# Patient Record
Sex: FEMALE | Race: WHITE | Employment: OTHER | URBAN - METROPOLITAN AREA
[De-identification: names, ages, dates, MRNs, and addresses within clinical notes are randomized per-mention and may not be internally consistent; named-entity substitution may affect disease eponyms.]

---

## 2017-03-15 PROCEDURE — 86038 ANTINUCLEAR ANTIBODIES: CPT | Performed by: PHYSICIAN ASSISTANT

## 2017-03-15 PROCEDURE — 86200 CCP ANTIBODY: CPT | Performed by: PHYSICIAN ASSISTANT

## 2017-03-15 PROCEDURE — 86431 RHEUMATOID FACTOR QUANT: CPT | Performed by: PHYSICIAN ASSISTANT

## 2017-03-15 PROCEDURE — 86812 HLA TYPING A B OR C: CPT | Performed by: PHYSICIAN ASSISTANT

## 2017-03-30 PROBLEM — F33.41 RECURRENT MAJOR DEPRESSIVE DISORDER, IN PARTIAL REMISSION (HCC): Status: ACTIVE | Noted: 2017-03-30

## 2017-03-30 PROBLEM — E66.01 SEVERE OBESITY (BMI 35.0-39.9): Status: ACTIVE | Noted: 2017-03-30

## 2017-04-13 RX ORDER — THIAMINE HCL 100 MG
500 TABLET ORAL DAILY
COMMUNITY
End: 2017-06-07

## 2017-04-19 ENCOUNTER — APPOINTMENT (OUTPATIENT)
Dept: LAB | Facility: HOSPITAL | Age: 67
End: 2017-04-19
Payer: MEDICARE

## 2017-04-19 DIAGNOSIS — K21.00 GASTROESOPHAGEAL REFLUX DISEASE WITH ESOPHAGITIS: ICD-10-CM

## 2017-04-19 PROCEDURE — 93010 ELECTROCARDIOGRAM REPORT: CPT | Performed by: INTERNAL MEDICINE

## 2017-04-19 PROCEDURE — 93005 ELECTROCARDIOGRAM TRACING: CPT

## 2017-05-11 ENCOUNTER — ANESTHESIA EVENT (OUTPATIENT)
Dept: ENDOSCOPY | Facility: HOSPITAL | Age: 67
End: 2017-05-11

## 2017-05-11 ENCOUNTER — HOSPITAL ENCOUNTER (OUTPATIENT)
Facility: HOSPITAL | Age: 67
Setting detail: HOSPITAL OUTPATIENT SURGERY
Discharge: HOME | End: 2017-05-11
Attending: INTERNAL MEDICINE | Admitting: INTERNAL MEDICINE
Payer: MEDICARE

## 2017-05-11 ENCOUNTER — ANESTHESIA (OUTPATIENT)
Dept: ENDOSCOPY | Facility: HOSPITAL | Age: 67
End: 2017-05-11

## 2017-05-11 ENCOUNTER — SURGERY (OUTPATIENT)
Age: 67
End: 2017-05-11

## 2017-05-11 VITALS
HEIGHT: 62 IN | RESPIRATION RATE: 16 BRPM | HEART RATE: 66 BPM | WEIGHT: 200 LBS | DIASTOLIC BLOOD PRESSURE: 87 MMHG | TEMPERATURE: 98 F | BODY MASS INDEX: 36.8 KG/M2 | OXYGEN SATURATION: 98 % | SYSTOLIC BLOOD PRESSURE: 119 MMHG

## 2017-05-11 DIAGNOSIS — R13.10 PROBLEMS WITH SWALLOWING AND MASTICATION: ICD-10-CM

## 2017-05-11 DIAGNOSIS — K21.00 GASTROESOPHAGEAL REFLUX DISEASE WITH ESOPHAGITIS: Primary | ICD-10-CM

## 2017-05-11 DIAGNOSIS — K21.00 REFLUX ESOPHAGITIS: ICD-10-CM

## 2017-05-11 DIAGNOSIS — K22.70 BARRETT'S ESOPHAGUS WITHOUT DYSPLASIA: ICD-10-CM

## 2017-05-11 DIAGNOSIS — K22.89 ESOPHAGEAL HEMORRHAGE: ICD-10-CM

## 2017-05-11 PROCEDURE — 88305 TISSUE EXAM BY PATHOLOGIST: CPT | Performed by: INTERNAL MEDICINE

## 2017-05-11 PROCEDURE — 0DB68ZX EXCISION OF STOMACH, VIA NATURAL OR ARTIFICIAL OPENING ENDOSCOPIC, DIAGNOSTIC: ICD-10-PCS | Performed by: INTERNAL MEDICINE

## 2017-05-11 PROCEDURE — 0DB78ZX EXCISION OF STOMACH, PYLORUS, VIA NATURAL OR ARTIFICIAL OPENING ENDOSCOPIC, DIAGNOSTIC: ICD-10-PCS | Performed by: INTERNAL MEDICINE

## 2017-05-11 PROCEDURE — 0DB98ZX EXCISION OF DUODENUM, VIA NATURAL OR ARTIFICIAL OPENING ENDOSCOPIC, DIAGNOSTIC: ICD-10-PCS | Performed by: INTERNAL MEDICINE

## 2017-05-11 RX ORDER — NALOXONE HYDROCHLORIDE 0.4 MG/ML
80 INJECTION, SOLUTION INTRAMUSCULAR; INTRAVENOUS; SUBCUTANEOUS AS NEEDED
Status: DISCONTINUED | OUTPATIENT
Start: 2017-05-11 | End: 2017-05-11

## 2017-05-11 RX ORDER — SODIUM CHLORIDE, SODIUM LACTATE, POTASSIUM CHLORIDE, CALCIUM CHLORIDE 600; 310; 30; 20 MG/100ML; MG/100ML; MG/100ML; MG/100ML
INJECTION, SOLUTION INTRAVENOUS CONTINUOUS
Status: DISCONTINUED | OUTPATIENT
Start: 2017-05-11 | End: 2017-05-11

## 2017-05-11 RX ORDER — ONDANSETRON 2 MG/ML
4 INJECTION INTRAMUSCULAR; INTRAVENOUS AS NEEDED
Status: DISCONTINUED | OUTPATIENT
Start: 2017-05-11 | End: 2017-05-11

## 2017-05-11 RX ORDER — DULOXETIN HYDROCHLORIDE 60 MG/1
60 CAPSULE, DELAYED RELEASE ORAL DAILY
COMMUNITY
End: 2017-06-07

## 2017-05-11 NOTE — OPERATIVE REPORT
[facility] OPERATIVE REPORT   PATIENT NAME: Sandeep Harmon  MRN: [id number]  DATE OF OPERATION: 5/11/2017  PREOPERATIVE DIAGNOSIS:   1. GERD  2. Chest pain  3. History of Genao's  4. Dysphagia   POSTOPERATIVE DIAGNOSES:  1. Gastritis  2.  Large hi the antrum and body were obtained with cold forceps in order to assess for H pylori. Several small gastric polyps were found in the body. A representative polyp was sampled with cold forceps.   A large hiatal hernia was found on retroflexed and forward vi

## 2017-05-11 NOTE — ANESTHESIA PREPROCEDURE EVALUATION
PRE-OP EVALUATION    Patient Name: Melba Bhatti    Pre-op Diagnosis: Reflux esophagitis [K21.0]  Esophageal hemorrhage [K22.8]  Problems with swallowing and mastication [R13.10]  Genao's esophagus without dysplasia [K22.70]    Procedure(s):  ESOPH Oxybutynin Chloride ER 5 MG Oral Tablet 24 Hr Take 1 tablet (5 mg total) by mouth daily. Disp: 90 tablet Rfl: 3   estradiol (CLIMARA) 0.05 MG/24HR Transdermal Patch Weekly Place 1 patch onto the skin every 7 days.  Disp: 12 patch Rfl: 4   Sulfacetamide So Available pre-op labs reviewed.     Lab Results  Component Value Date   WBC 8.09 03/15/2017   RBC 4.66 03/15/2017   HGB 14.7 03/15/2017   HCT 43.2 03/15/2017   MCV 92.7 03/15/2017   MCH 31.5 03/15/2017   MCHC 34.0 03/15/2017   RDW 12.7 03/15/2017   PLT

## 2017-05-11 NOTE — H&P
GI PRE-OP H&P    CC: GERD, non cardiac chest pain, Dysphagia, Genao's esophagus without dysplasia,       HPI:  Juan Rubin is a 74year old female who is here for EGD for above indications.     Past Medical History   Diagnosis Date   • GERD (gastr Mother      osteoarthritis   • Breast Cancer Sister 58   • Thyroid Disorder Sister        Smoking Status: Former Smoker                   Packs/Day: 0.25  Years: 7         Types: Cigarettes      Quit date: 08/27/1972    Smokeless Status: Never Used

## 2017-05-11 NOTE — ANESTHESIA POSTPROCEDURE EVALUATION
[address] [place] [address] Patient Status:  Hospital Outpatient Surgery   Age/Gender 74year old female MRN [id number]   Location [facility]. Attending Ike Adams MD   Hosp Day # 0 PCP Carson Mendoza MD       Anesthesia Po

## 2017-05-18 NOTE — PROGRESS NOTES
Quick Note:    [place] [address]    Dear Ms Nubia Booth    Your biopsies of the small bowel are normal. Your stomach polyps are fundic gland polyps.  This is a benign type of polyp usually caused by antacid medications

## 2017-06-19 PROBLEM — G89.29 CHRONIC LEFT SHOULDER PAIN: Status: ACTIVE | Noted: 2017-06-19

## 2017-06-19 PROBLEM — M75.02 ADHESIVE CAPSULITIS OF LEFT SHOULDER: Status: ACTIVE | Noted: 2017-06-19

## 2017-06-19 PROBLEM — G56.03 BILATERAL CARPAL TUNNEL SYNDROME: Status: ACTIVE | Noted: 2017-06-19

## 2017-06-19 PROBLEM — M65.312 TRIGGER FINGER OF LEFT THUMB: Status: ACTIVE | Noted: 2017-06-19

## 2017-06-19 PROBLEM — M25.512 CHRONIC LEFT SHOULDER PAIN: Status: ACTIVE | Noted: 2017-06-19

## 2017-06-19 PROBLEM — G56.23 CUBITAL TUNNEL SYNDROME, BILATERAL: Status: ACTIVE | Noted: 2017-06-19

## 2017-07-03 PROBLEM — M67.432 GANGLION, LEFT WRIST: Status: ACTIVE | Noted: 2017-07-03

## 2017-07-03 PROBLEM — M65.4 DE QUERVAIN'S TENOSYNOVITIS, LEFT: Status: ACTIVE | Noted: 2017-07-03

## 2017-08-04 PROBLEM — M54.12 CERVICAL RADICULITIS: Status: ACTIVE | Noted: 2017-08-04

## 2017-09-19 RX ORDER — METOCLOPRAMIDE 10 MG/1
10 TABLET ORAL ONCE
Status: CANCELLED | OUTPATIENT
Start: 2017-09-19 | End: 2017-09-19

## 2017-09-22 ENCOUNTER — ANESTHESIA (OUTPATIENT)
Dept: SURGERY | Facility: HOSPITAL | Age: 67
End: 2017-09-22
Payer: MEDICARE

## 2017-09-22 ENCOUNTER — ANESTHESIA EVENT (OUTPATIENT)
Dept: SURGERY | Facility: HOSPITAL | Age: 67
End: 2017-09-22
Payer: MEDICARE

## 2017-09-22 ENCOUNTER — SURGERY (OUTPATIENT)
Age: 67
End: 2017-09-22

## 2017-09-22 ENCOUNTER — HOSPITAL ENCOUNTER (OUTPATIENT)
Facility: HOSPITAL | Age: 67
Setting detail: HOSPITAL OUTPATIENT SURGERY
Discharge: HOME | End: 2017-09-22
Attending: ORTHOPAEDIC SURGERY | Admitting: ORTHOPAEDIC SURGERY
Payer: MEDICARE

## 2017-09-22 VITALS
RESPIRATION RATE: 14 BRPM | WEIGHT: 192 LBS | HEART RATE: 64 BPM | SYSTOLIC BLOOD PRESSURE: 134 MMHG | HEIGHT: 62 IN | OXYGEN SATURATION: 95 % | DIASTOLIC BLOOD PRESSURE: 81 MMHG | BODY MASS INDEX: 35.33 KG/M2 | TEMPERATURE: 98 F

## 2017-09-22 DIAGNOSIS — G56.02 CARPAL TUNNEL SYNDROME ON LEFT: ICD-10-CM

## 2017-09-22 DIAGNOSIS — M65.312 TRIGGER FINGER OF LEFT THUMB: Primary | ICD-10-CM

## 2017-09-22 PROCEDURE — 01N50ZZ RELEASE MEDIAN NERVE, OPEN APPROACH: ICD-10-PCS | Performed by: ORTHOPAEDIC SURGERY

## 2017-09-22 PROCEDURE — 0LN80ZZ RELEASE LEFT HAND TENDON, OPEN APPROACH: ICD-10-PCS | Performed by: ORTHOPAEDIC SURGERY

## 2017-09-22 RX ORDER — NALOXONE HYDROCHLORIDE 0.4 MG/ML
80 INJECTION, SOLUTION INTRAMUSCULAR; INTRAVENOUS; SUBCUTANEOUS AS NEEDED
Status: DISCONTINUED | OUTPATIENT
Start: 2017-09-22 | End: 2017-09-22

## 2017-09-22 RX ORDER — SODIUM CHLORIDE, SODIUM LACTATE, POTASSIUM CHLORIDE, CALCIUM CHLORIDE 600; 310; 30; 20 MG/100ML; MG/100ML; MG/100ML; MG/100ML
INJECTION, SOLUTION INTRAVENOUS CONTINUOUS
Status: DISCONTINUED | OUTPATIENT
Start: 2017-09-22 | End: 2017-09-22

## 2017-09-22 RX ORDER — HYDROCODONE BITARTRATE AND ACETAMINOPHEN 5; 325 MG/1; MG/1
1 TABLET ORAL AS NEEDED
Status: DISCONTINUED | OUTPATIENT
Start: 2017-09-22 | End: 2017-09-22

## 2017-09-22 RX ORDER — ACETAMINOPHEN 325 MG/1
650 TABLET ORAL ONCE
Status: COMPLETED | OUTPATIENT
Start: 2017-09-22 | End: 2017-09-22

## 2017-09-22 RX ORDER — HYDROMORPHONE HYDROCHLORIDE 1 MG/ML
0.6 INJECTION, SOLUTION INTRAMUSCULAR; INTRAVENOUS; SUBCUTANEOUS EVERY 5 MIN PRN
Status: DISCONTINUED | OUTPATIENT
Start: 2017-09-22 | End: 2017-09-22

## 2017-09-22 RX ORDER — KETOROLAC TROMETHAMINE 30 MG/ML
INJECTION, SOLUTION INTRAMUSCULAR; INTRAVENOUS AS NEEDED
Status: DISCONTINUED | OUTPATIENT
Start: 2017-09-22 | End: 2017-09-22 | Stop reason: SURG

## 2017-09-22 RX ORDER — HYDROMORPHONE HYDROCHLORIDE 1 MG/ML
0.4 INJECTION, SOLUTION INTRAMUSCULAR; INTRAVENOUS; SUBCUTANEOUS EVERY 5 MIN PRN
Status: DISCONTINUED | OUTPATIENT
Start: 2017-09-22 | End: 2017-09-22

## 2017-09-22 RX ORDER — HYDROCODONE BITARTRATE AND ACETAMINOPHEN 5; 325 MG/1; MG/1
1-2 TABLET ORAL EVERY 6 HOURS PRN
Qty: 20 TABLET | Refills: 0 | Status: SHIPPED | OUTPATIENT
Start: 2017-09-22 | End: 2017-10-25 | Stop reason: ALTCHOICE

## 2017-09-22 RX ORDER — ONDANSETRON 2 MG/ML
4 INJECTION INTRAMUSCULAR; INTRAVENOUS EVERY 6 HOURS PRN
Status: CANCELLED | OUTPATIENT
Start: 2017-09-22

## 2017-09-22 RX ORDER — MORPHINE SULFATE 10 MG/ML
6 INJECTION, SOLUTION INTRAMUSCULAR; INTRAVENOUS EVERY 10 MIN PRN
Status: DISCONTINUED | OUTPATIENT
Start: 2017-09-22 | End: 2017-09-22

## 2017-09-22 RX ORDER — ONDANSETRON 2 MG/ML
4 INJECTION INTRAMUSCULAR; INTRAVENOUS ONCE AS NEEDED
Status: DISCONTINUED | OUTPATIENT
Start: 2017-09-22 | End: 2017-09-22

## 2017-09-22 RX ORDER — HYDROMORPHONE HYDROCHLORIDE 1 MG/ML
0.2 INJECTION, SOLUTION INTRAMUSCULAR; INTRAVENOUS; SUBCUTANEOUS EVERY 5 MIN PRN
Status: DISCONTINUED | OUTPATIENT
Start: 2017-09-22 | End: 2017-09-22

## 2017-09-22 RX ORDER — FAMOTIDINE 20 MG/1
20 TABLET ORAL ONCE
Status: COMPLETED | OUTPATIENT
Start: 2017-09-22 | End: 2017-09-22

## 2017-09-22 RX ORDER — HYDROCODONE BITARTRATE AND ACETAMINOPHEN 5; 325 MG/1; MG/1
2 TABLET ORAL AS NEEDED
Status: DISCONTINUED | OUTPATIENT
Start: 2017-09-22 | End: 2017-09-22

## 2017-09-22 RX ORDER — LIDOCAINE HYDROCHLORIDE 10 MG/ML
INJECTION, SOLUTION EPIDURAL; INFILTRATION; INTRACAUDAL; PERINEURAL AS NEEDED
Status: DISCONTINUED | OUTPATIENT
Start: 2017-09-22 | End: 2017-09-22 | Stop reason: HOSPADM

## 2017-09-22 RX ORDER — MIDAZOLAM HYDROCHLORIDE 1 MG/ML
INJECTION INTRAMUSCULAR; INTRAVENOUS AS NEEDED
Status: DISCONTINUED | OUTPATIENT
Start: 2017-09-22 | End: 2017-09-22 | Stop reason: SURG

## 2017-09-22 RX ORDER — BUPIVACAINE HYDROCHLORIDE 5 MG/ML
INJECTION, SOLUTION EPIDURAL; INTRACAUDAL AS NEEDED
Status: DISCONTINUED | OUTPATIENT
Start: 2017-09-22 | End: 2017-09-22 | Stop reason: HOSPADM

## 2017-09-22 RX ORDER — HALOPERIDOL 5 MG/ML
0.25 INJECTION INTRAMUSCULAR ONCE AS NEEDED
Status: DISCONTINUED | OUTPATIENT
Start: 2017-09-22 | End: 2017-09-22

## 2017-09-22 RX ORDER — MORPHINE SULFATE 2 MG/ML
2 INJECTION, SOLUTION INTRAMUSCULAR; INTRAVENOUS EVERY 10 MIN PRN
Status: DISCONTINUED | OUTPATIENT
Start: 2017-09-22 | End: 2017-09-22

## 2017-09-22 RX ORDER — MORPHINE SULFATE 4 MG/ML
4 INJECTION, SOLUTION INTRAMUSCULAR; INTRAVENOUS EVERY 10 MIN PRN
Status: DISCONTINUED | OUTPATIENT
Start: 2017-09-22 | End: 2017-09-22

## 2017-09-22 RX ADMIN — KETOROLAC TROMETHAMINE 30 MG: 30 INJECTION, SOLUTION INTRAMUSCULAR; INTRAVENOUS at 12:33:00

## 2017-09-22 RX ADMIN — SODIUM CHLORIDE, SODIUM LACTATE, POTASSIUM CHLORIDE, CALCIUM CHLORIDE: 600; 310; 30; 20 INJECTION, SOLUTION INTRAVENOUS at 12:03:00

## 2017-09-22 RX ADMIN — MIDAZOLAM HYDROCHLORIDE 2 MG: 1 INJECTION INTRAMUSCULAR; INTRAVENOUS at 12:05:00

## 2017-09-22 RX ADMIN — SODIUM CHLORIDE, SODIUM LACTATE, POTASSIUM CHLORIDE, CALCIUM CHLORIDE: 600; 310; 30; 20 INJECTION, SOLUTION INTRAVENOUS at 12:30:00

## 2017-09-22 RX ADMIN — LIDOCAINE HYDROCHLORIDE 25 MG: 10 INJECTION, SOLUTION EPIDURAL; INFILTRATION; INTRACAUDAL; PERINEURAL at 12:08:00

## 2017-09-22 NOTE — H&P
[facility]  156/1950  74year old   female  Keo Mederos MD     HPI:   Patient presents with:  Wrist Pain: left wrist  Hand Pain: left thumb        The patient complains of pain located left thumb and left hand.   The pain is increasing with r HISTORY:        Past Medical History:   Diagnosis Date   • Depression 8/27/2012   • FHx: breast cancer       sister age 58   • Fibromyalgia     • GERD (gastroesophageal reflux disease) 8/27/2012   • Gluten intolerance 7/11/2014     per GI celiac disease Cigarettes     Quit date: 8/27/1972  Smokeless tobacco: Never Used                      Alcohol use: Yes           0.0 oz/week     Comment: rare, twice per month               EXAM:   LMP  (LMP Unknown)   The patient is awake and oriented x 3 and overall w including death.   The patient consented to the procedure.      All of their questions were answered and they are in agreement with the treatment plan.     The patient will return to clinic in2 weeks postop

## 2017-09-22 NOTE — ANESTHESIA PREPROCEDURE EVALUATION
Anesthesia PreOp Note    HPI:     Tee Epps is a 74year old female who presents for preoperative consultation requested by: Gris Foley MD    Date of Surgery: 9/22/2017    Procedure(s):  WRIST CARPAL TUNNEL RELEASE  FINGER TRIGGER RELEASE Restless legs syndrome (RLS) 8/27/2012   • Thyroid nodule     Dr Kirsten Flowers biopsy 2015 negative    • Unspecified essential hypertension    • Unspecified sleep apnea PSG 9-21-14 TX 10-20-14    AHI 29 RDI 29 REM AHI 48 SaO2 jacquelyn 84 % autoPAP 8-20 C[facility] TAKE 1 TABLET(40 MG) BY MOUTH EVERY MORNING BEFORE BREAKFAST Disp: 90 tablet Rfl: 3 9/21/2017 at 0630   BusPIRone HCl 15 MG Oral Tab Take 1 tablet (15 mg total) by mouth 2 (two) times daily.  Disp:  Rfl: 0 9/22/2017 at [facility] 10 Her oral temperature is 98.4 °F (36.9 °C). Her blood pressure is 149/82 and her pulse is 81. Her respiration is 16 and oxygen saturation is 98%. 09/19/17  1724 09/22/17  0927   BP:  149/82   BP Location:  Right arm   Pulse:  81   Resp:  16   Temp:  98. 4

## 2017-09-22 NOTE — ANESTHESIA POSTPROCEDURE EVALUATION
Patient: Clau Reese    Procedure Summary     Date:  09/22/17 Room / Location:  [facility] MAIN OR 05 / [address] [place] [address] MAIN OR    Anesthesia Start:  6602 Anesthesia Stop:  5919    Procedures:       WRIST CARPAL TUNNEL RELEASE (Left )      FINGER TRIGGER RELEASE (Lef

## 2017-09-22 NOTE — BRIEF OP NOTE
Pre-Operative Diagnosis: carpal tunnel, trigger thumb     Post-Operative Diagnosis: carpal tunnel, trigger thumb     Procedure Performed:   Procedure(s):  left carpal tunnel release and left thumb trigger release      Surgeon(s) and Role:     Joe Thao

## 2017-09-23 NOTE — OPERATIVE REPORT
[facility]    PATIENT'S NAME: Gloria Chaves   ATTENDING PHYSICIAN: Monica Gomez MD   OPERATING PHYSICIAN: Monica Gomez MD   PATIENT ACCOUNT#:   [id number]    LOCATION:  [place] [address]  MEDICAL RECORD #:   [id number] was transferred to the operating room and transferred to the operating table in a supine position.   Under sterile conditions, local anesthesia with 1% lidocaine and 0.5% Marcaine in a 1:1 mixture was given at the planned site of incision in the form of a m placed. Tourniquet was deflated following wound closure. Blood loss was minimal.  Complications were none. The patient was aroused from anesthesia and transferred to the recovery room in stable condition.       DISPOSITION:  She was discharged to home in

## 2017-09-28 PROBLEM — G56.02 CARPAL TUNNEL SYNDROME ON LEFT: Status: ACTIVE | Noted: 2017-09-28

## 2017-11-02 PROBLEM — M12.812 LEFT ROTATOR CUFF TEAR ARTHROPATHY: Status: ACTIVE | Noted: 2017-11-02

## 2017-11-02 PROBLEM — M17.12 PRIMARY OSTEOARTHRITIS OF LEFT KNEE: Status: ACTIVE | Noted: 2017-11-02

## 2017-11-02 PROBLEM — M75.102 LEFT ROTATOR CUFF TEAR ARTHROPATHY: Status: ACTIVE | Noted: 2017-11-02

## 2017-11-09 PROCEDURE — 86618 LYME DISEASE ANTIBODY: CPT | Performed by: OTHER

## 2017-11-09 PROCEDURE — 82607 VITAMIN B-12: CPT | Performed by: OTHER

## 2017-11-09 PROCEDURE — 83883 ASSAY NEPHELOMETRY NOT SPEC: CPT | Performed by: OTHER

## 2017-11-09 PROCEDURE — 84165 PROTEIN E-PHORESIS SERUM: CPT | Performed by: OTHER

## 2017-11-09 PROCEDURE — 86334 IMMUNOFIX E-PHORESIS SERUM: CPT | Performed by: OTHER

## 2018-01-12 PROBLEM — M25.551 RIGHT HIP PAIN: Status: ACTIVE | Noted: 2018-01-12

## 2018-01-26 PROBLEM — M65.312 TRIGGER FINGER OF LEFT THUMB: Status: RESOLVED | Noted: 2017-06-19 | Resolved: 2018-01-26

## 2018-01-26 PROBLEM — G56.03 BILATERAL CARPAL TUNNEL SYNDROME: Status: RESOLVED | Noted: 2017-06-19 | Resolved: 2018-01-26

## 2018-01-26 PROBLEM — M25.551 RIGHT HIP PAIN: Status: RESOLVED | Noted: 2018-01-12 | Resolved: 2018-01-26

## 2018-01-26 PROBLEM — G56.02 CARPAL TUNNEL SYNDROME ON LEFT: Status: RESOLVED | Noted: 2017-09-28 | Resolved: 2018-01-26

## 2018-01-26 PROBLEM — G56.23 CUBITAL TUNNEL SYNDROME, BILATERAL: Status: RESOLVED | Noted: 2017-06-19 | Resolved: 2018-01-26

## 2018-01-26 PROCEDURE — 87081 CULTURE SCREEN ONLY: CPT | Performed by: INTERNAL MEDICINE

## 2018-01-31 NOTE — H&P
[facility]    PATIENT'S NAME: Mariama Hagen   ATTENDING PHYSICIAN: Luis Enrique Trivedi M.D.    PATIENT ACCOUNT#:   [de-identified]    LOCATION:  OR   [facility]  MEDICAL RECORD #:   [id number]       YOB: 1950  ADMISSION DATE:       02/06/2018 nodes.  NEUROLOGIC:  She is alert and oriented x3. Cranial nerves II through XII are intact. PLAN:  The patient is scheduled for elective left reverse shoulder arthroplasty. Limitations, risks, complications, postoperative scenarios are stressed.   P

## 2018-02-05 ENCOUNTER — ANESTHESIA EVENT (OUTPATIENT)
Dept: SURGERY | Facility: HOSPITAL | Age: 68
DRG: 483 | End: 2018-02-05
Payer: MEDICARE

## 2018-02-06 ENCOUNTER — SURGERY (OUTPATIENT)
Age: 68
End: 2018-02-06

## 2018-02-06 ENCOUNTER — APPOINTMENT (OUTPATIENT)
Dept: GENERAL RADIOLOGY | Facility: HOSPITAL | Age: 68
DRG: 483 | End: 2018-02-06
Attending: ORTHOPAEDIC SURGERY
Payer: MEDICARE

## 2018-02-06 ENCOUNTER — HOSPITAL ENCOUNTER (INPATIENT)
Facility: HOSPITAL | Age: 68
LOS: 1 days | Discharge: HOME HEALTH | DRG: 483 | End: 2018-02-07
Attending: ORTHOPAEDIC SURGERY | Admitting: ORTHOPAEDIC SURGERY
Payer: MEDICARE

## 2018-02-06 ENCOUNTER — ANESTHESIA (OUTPATIENT)
Dept: SURGERY | Facility: HOSPITAL | Age: 68
DRG: 483 | End: 2018-02-06
Payer: MEDICARE

## 2018-02-06 LAB
ANTIBODY SCREEN: NEGATIVE
RH BLOOD TYPE: POSITIVE

## 2018-02-06 PROCEDURE — 76942 ECHO GUIDE FOR BIOPSY: CPT | Performed by: ORTHOPAEDIC SURGERY

## 2018-02-06 PROCEDURE — 0RRK00Z REPLACEMENT OF LEFT SHOULDER JOINT WITH REVERSE BALL AND SOCKET SYNTHETIC SUBSTITUTE, OPEN APPROACH: ICD-10-PCS | Performed by: ORTHOPAEDIC SURGERY

## 2018-02-06 PROCEDURE — 86901 BLOOD TYPING SEROLOGIC RH(D): CPT

## 2018-02-06 PROCEDURE — 3E0T3BZ INTRODUCTION OF ANESTHETIC AGENT INTO PERIPHERAL NERVES AND PLEXI, PERCUTANEOUS APPROACH: ICD-10-PCS | Performed by: ANESTHESIOLOGY

## 2018-02-06 PROCEDURE — 88311 DECALCIFY TISSUE: CPT | Performed by: ORTHOPAEDIC SURGERY

## 2018-02-06 PROCEDURE — 86850 RBC ANTIBODY SCREEN: CPT

## 2018-02-06 PROCEDURE — 73020 X-RAY EXAM OF SHOULDER: CPT | Performed by: ORTHOPAEDIC SURGERY

## 2018-02-06 PROCEDURE — 86900 BLOOD TYPING SEROLOGIC ABO: CPT

## 2018-02-06 PROCEDURE — 94660 CPAP INITIATION&MGMT: CPT

## 2018-02-06 PROCEDURE — 5A09357 ASSISTANCE WITH RESPIRATORY VENTILATION, LESS THAN 24 CONSECUTIVE HOURS, CONTINUOUS POSITIVE AIRWAY PRESSURE: ICD-10-PCS | Performed by: ORTHOPAEDIC SURGERY

## 2018-02-06 PROCEDURE — 88305 TISSUE EXAM BY PATHOLOGIST: CPT | Performed by: ORTHOPAEDIC SURGERY

## 2018-02-06 DEVICE — SCRW EQN LCK 4.5X22: Type: IMPLANTABLE DEVICE | Site: SHOULDER | Status: FUNCTIONAL

## 2018-02-06 DEVICE — IMPLANTABLE DEVICE: Type: IMPLANTABLE DEVICE | Site: SHOULDER | Status: FUNCTIONAL

## 2018-02-06 DEVICE — SCRW EQN LCK 4.5X26: Type: IMPLANTABLE DEVICE | Site: SHOULDER | Status: FUNCTIONAL

## 2018-02-06 DEVICE — KIT SCR SHLDR RVRS TRQ DEFINE: Type: IMPLANTABLE DEVICE | Site: SHOULDER | Status: FUNCTIONAL

## 2018-02-06 DEVICE — PLATE EQUINOXE GLND STD RVRS: Type: IMPLANTABLE DEVICE | Site: SHOULDER | Status: FUNCTIONAL

## 2018-02-06 DEVICE — SCREW EQUINOXE BN SHLDR: Type: IMPLANTABLE DEVICE | Site: SHOULDER | Status: FUNCTIONAL

## 2018-02-06 DEVICE — KIT TISS CLOS TISSEEL 4ML: Type: IMPLANTABLE DEVICE | Site: SHOULDER | Status: FUNCTIONAL

## 2018-02-06 DEVICE — TRAY EQUINOXE HUM ADPR +0MM: Type: IMPLANTABLE DEVICE | Site: SHOULDER | Status: FUNCTIONAL

## 2018-02-06 RX ORDER — SODIUM PHOSPHATE, DIBASIC AND SODIUM PHOSPHATE, MONOBASIC 7; 19 G/133ML; G/133ML
1 ENEMA RECTAL ONCE AS NEEDED
Status: DISCONTINUED | OUTPATIENT
Start: 2018-02-06 | End: 2018-02-07

## 2018-02-06 RX ORDER — OXYCODONE HCL 10 MG/1
10 TABLET, FILM COATED, EXTENDED RELEASE ORAL
Status: DISCONTINUED | OUTPATIENT
Start: 2018-02-06 | End: 2018-02-07

## 2018-02-06 RX ORDER — MORPHINE SULFATE 2 MG/ML
2 INJECTION, SOLUTION INTRAMUSCULAR; INTRAVENOUS EVERY 5 MIN PRN
Status: DISCONTINUED | OUTPATIENT
Start: 2018-02-06 | End: 2018-02-06 | Stop reason: HOSPADM

## 2018-02-06 RX ORDER — MIDAZOLAM HYDROCHLORIDE 1 MG/ML
1 INJECTION INTRAMUSCULAR; INTRAVENOUS EVERY 5 MIN PRN
Status: DISCONTINUED | OUTPATIENT
Start: 2018-02-06 | End: 2018-02-06 | Stop reason: HOSPADM

## 2018-02-06 RX ORDER — ONDANSETRON 2 MG/ML
4 INJECTION INTRAMUSCULAR; INTRAVENOUS EVERY 4 HOURS PRN
Status: DISCONTINUED | OUTPATIENT
Start: 2018-02-06 | End: 2018-02-07

## 2018-02-06 RX ORDER — CEFAZOLIN SODIUM/WATER 2 G/20 ML
2 SYRINGE (ML) INTRAVENOUS EVERY 8 HOURS
Status: COMPLETED | OUTPATIENT
Start: 2018-02-06 | End: 2018-02-07

## 2018-02-06 RX ORDER — MORPHINE SULFATE 2 MG/ML
4 INJECTION, SOLUTION INTRAMUSCULAR; INTRAVENOUS EVERY 2 HOUR PRN
Status: DISCONTINUED | OUTPATIENT
Start: 2018-02-06 | End: 2018-02-07

## 2018-02-06 RX ORDER — METOCLOPRAMIDE HYDROCHLORIDE 5 MG/ML
10 INJECTION INTRAMUSCULAR; INTRAVENOUS EVERY 6 HOURS PRN
Status: DISCONTINUED | OUTPATIENT
Start: 2018-02-06 | End: 2018-02-07

## 2018-02-06 RX ORDER — MORPHINE SULFATE 2 MG/ML
1.5 INJECTION, SOLUTION INTRAMUSCULAR; INTRAVENOUS EVERY 2 HOUR PRN
Status: DISCONTINUED | OUTPATIENT
Start: 2018-02-06 | End: 2018-02-07

## 2018-02-06 RX ORDER — MORPHINE SULFATE 2 MG/ML
2 INJECTION, SOLUTION INTRAMUSCULAR; INTRAVENOUS EVERY 2 HOUR PRN
Status: DISCONTINUED | OUTPATIENT
Start: 2018-02-06 | End: 2018-02-07

## 2018-02-06 RX ORDER — ACETAMINOPHEN 325 MG/1
650 TABLET ORAL 4 TIMES DAILY
Status: DISCONTINUED | OUTPATIENT
Start: 2018-02-06 | End: 2018-02-07

## 2018-02-06 RX ORDER — SODIUM CHLORIDE 9 MG/ML
INJECTION, SOLUTION INTRAVENOUS CONTINUOUS
Status: DISCONTINUED | OUTPATIENT
Start: 2018-02-06 | End: 2018-02-07

## 2018-02-06 RX ORDER — ZOLPIDEM TARTRATE 5 MG/1
5 TABLET ORAL NIGHTLY PRN
Status: DISCONTINUED | OUTPATIENT
Start: 2018-02-06 | End: 2018-02-07

## 2018-02-06 RX ORDER — MEPERIDINE HYDROCHLORIDE 25 MG/ML
12.5 INJECTION INTRAMUSCULAR; INTRAVENOUS; SUBCUTANEOUS AS NEEDED
Status: DISCONTINUED | OUTPATIENT
Start: 2018-02-06 | End: 2018-02-06 | Stop reason: HOSPADM

## 2018-02-06 RX ORDER — BISACODYL 10 MG
10 SUPPOSITORY, RECTAL RECTAL
Status: DISCONTINUED | OUTPATIENT
Start: 2018-02-06 | End: 2018-02-07

## 2018-02-06 RX ORDER — BUSPIRONE HYDROCHLORIDE 5 MG/1
15 TABLET ORAL 2 TIMES DAILY
Status: DISCONTINUED | OUTPATIENT
Start: 2018-02-06 | End: 2018-02-07

## 2018-02-06 RX ORDER — OXYCODONE HYDROCHLORIDE 10 MG/1
10 TABLET ORAL EVERY 4 HOURS PRN
Status: DISCONTINUED | OUTPATIENT
Start: 2018-02-06 | End: 2018-02-07

## 2018-02-06 RX ORDER — DIPHENHYDRAMINE HYDROCHLORIDE 50 MG/ML
25 INJECTION INTRAMUSCULAR; INTRAVENOUS ONCE AS NEEDED
Status: ACTIVE | OUTPATIENT
Start: 2018-02-06 | End: 2018-02-06

## 2018-02-06 RX ORDER — LOSARTAN POTASSIUM 100 MG/1
100 TABLET ORAL DAILY
Status: DISCONTINUED | OUTPATIENT
Start: 2018-02-06 | End: 2018-02-07

## 2018-02-06 RX ORDER — OXYCODONE HCL 10 MG/1
10 TABLET, FILM COATED, EXTENDED RELEASE ORAL
Status: COMPLETED | OUTPATIENT
Start: 2018-02-06 | End: 2018-02-06

## 2018-02-06 RX ORDER — CEFAZOLIN SODIUM/WATER 2 G/20 ML
2 SYRINGE (ML) INTRAVENOUS ONCE
Status: DISCONTINUED | OUTPATIENT
Start: 2018-02-06 | End: 2018-02-06 | Stop reason: HOSPADM

## 2018-02-06 RX ORDER — DIPHENHYDRAMINE HYDROCHLORIDE 50 MG/ML
12.5 INJECTION INTRAMUSCULAR; INTRAVENOUS AS NEEDED
Status: DISCONTINUED | OUTPATIENT
Start: 2018-02-06 | End: 2018-02-06 | Stop reason: HOSPADM

## 2018-02-06 RX ORDER — FAMOTIDINE 20 MG/1
40 TABLET ORAL DAILY
Status: DISCONTINUED | OUTPATIENT
Start: 2018-02-06 | End: 2018-02-07

## 2018-02-06 RX ORDER — OXYCODONE HYDROCHLORIDE 5 MG/1
2.5 TABLET ORAL EVERY 4 HOURS PRN
Status: DISCONTINUED | OUTPATIENT
Start: 2018-02-06 | End: 2018-02-07

## 2018-02-06 RX ORDER — DOCUSATE SODIUM 100 MG/1
100 CAPSULE, LIQUID FILLED ORAL 2 TIMES DAILY
Status: DISCONTINUED | OUTPATIENT
Start: 2018-02-06 | End: 2018-02-07

## 2018-02-06 RX ORDER — SODIUM CHLORIDE, SODIUM LACTATE, POTASSIUM CHLORIDE, CALCIUM CHLORIDE 600; 310; 30; 20 MG/100ML; MG/100ML; MG/100ML; MG/100ML
INJECTION, SOLUTION INTRAVENOUS CONTINUOUS
Status: DISCONTINUED | OUTPATIENT
Start: 2018-02-06 | End: 2018-02-06 | Stop reason: HOSPADM

## 2018-02-06 RX ORDER — ONDANSETRON 2 MG/ML
4 INJECTION INTRAMUSCULAR; INTRAVENOUS AS NEEDED
Status: DISCONTINUED | OUTPATIENT
Start: 2018-02-06 | End: 2018-02-06 | Stop reason: HOSPADM

## 2018-02-06 RX ORDER — OXYCODONE HYDROCHLORIDE 5 MG/1
5 TABLET ORAL EVERY 4 HOURS PRN
Status: DISCONTINUED | OUTPATIENT
Start: 2018-02-06 | End: 2018-02-07

## 2018-02-06 RX ORDER — CEFAZOLIN SODIUM 1 G/3ML
INJECTION, POWDER, FOR SOLUTION INTRAMUSCULAR; INTRAVENOUS
Status: DISCONTINUED | OUTPATIENT
Start: 2018-02-06 | End: 2018-02-06 | Stop reason: HOSPADM

## 2018-02-06 RX ORDER — POLYETHYLENE GLYCOL 3350 17 G/17G
17 POWDER, FOR SOLUTION ORAL DAILY PRN
Status: DISCONTINUED | OUTPATIENT
Start: 2018-02-06 | End: 2018-02-07

## 2018-02-06 RX ORDER — MORPHINE SULFATE 2 MG/ML
1 INJECTION, SOLUTION INTRAMUSCULAR; INTRAVENOUS EVERY 2 HOUR PRN
Status: DISCONTINUED | OUTPATIENT
Start: 2018-02-06 | End: 2018-02-07

## 2018-02-06 RX ORDER — TIZANIDINE 4 MG/1
4 TABLET ORAL EVERY 6 HOURS PRN
Status: DISCONTINUED | OUTPATIENT
Start: 2018-02-06 | End: 2018-02-07

## 2018-02-06 RX ORDER — ACETAMINOPHEN 325 MG/1
TABLET ORAL
Status: COMPLETED
Start: 2018-02-06 | End: 2018-02-06

## 2018-02-06 RX ORDER — ACETAMINOPHEN 325 MG/1
650 TABLET ORAL ONCE
Status: COMPLETED | OUTPATIENT
Start: 2018-02-06 | End: 2018-02-06

## 2018-02-06 RX ORDER — MORPHINE SULFATE 2 MG/ML
INJECTION, SOLUTION INTRAMUSCULAR; INTRAVENOUS
Status: COMPLETED
Start: 2018-02-06 | End: 2018-02-06

## 2018-02-06 RX ORDER — DULOXETIN HYDROCHLORIDE 30 MG/1
90 CAPSULE, DELAYED RELEASE ORAL DAILY
Status: DISCONTINUED | OUTPATIENT
Start: 2018-02-06 | End: 2018-02-07

## 2018-02-06 RX ORDER — TERAZOSIN 5 MG/1
5 CAPSULE ORAL NIGHTLY
Status: DISCONTINUED | OUTPATIENT
Start: 2018-02-06 | End: 2018-02-07

## 2018-02-06 RX ORDER — METOCLOPRAMIDE HYDROCHLORIDE 5 MG/ML
10 INJECTION INTRAMUSCULAR; INTRAVENOUS AS NEEDED
Status: DISCONTINUED | OUTPATIENT
Start: 2018-02-06 | End: 2018-02-06 | Stop reason: HOSPADM

## 2018-02-06 RX ORDER — TRAMADOL HYDROCHLORIDE 50 MG/1
50 TABLET ORAL EVERY 6 HOURS
Status: DISCONTINUED | OUTPATIENT
Start: 2018-02-06 | End: 2018-02-07

## 2018-02-06 RX ORDER — NALOXONE HYDROCHLORIDE 0.4 MG/ML
80 INJECTION, SOLUTION INTRAMUSCULAR; INTRAVENOUS; SUBCUTANEOUS AS NEEDED
Status: DISCONTINUED | OUTPATIENT
Start: 2018-02-06 | End: 2018-02-06 | Stop reason: HOSPADM

## 2018-02-06 RX ORDER — PANTOPRAZOLE SODIUM 40 MG/1
40 TABLET, DELAYED RELEASE ORAL DAILY
Status: DISCONTINUED | OUTPATIENT
Start: 2018-02-06 | End: 2018-02-07

## 2018-02-06 RX ORDER — OXYBUTYNIN CHLORIDE 10 MG/1
10 TABLET, EXTENDED RELEASE ORAL
Status: DISCONTINUED | OUTPATIENT
Start: 2018-02-06 | End: 2018-02-07

## 2018-02-06 NOTE — INTERVAL H&P NOTE
Pre-op Diagnosis: ROTATOR CUFF TEAR AND ARTHRITIS LEFT SHOULDER    The above referenced H&P was reviewed by Trang Eddy MD on 2/6/2018, the patient was examined and no significant changes have occurred in the patient's condition since the H&P was perf

## 2018-02-06 NOTE — ANESTHESIA POSTPROCEDURE EVALUATION
[address] [place] [address] Patient Status:  Surgery Admit   Age/Gender 69year old female MRN [id number]   Location 13[facility] Attending Belen Chun MD   Hosp Day # 0 PCP Dominique Arredondo MD       Anesthesia Po

## 2018-02-06 NOTE — BRIEF OP NOTE
Pre-Operative Diagnosis: ROTATOR CUFF TEAR AND ARTHRITIS LEFT SHOULDER     Post-Operative Diagnosis: ROTATOR CUFF TEAR AND ARTHRITIS LEFT SHOULDER     Procedure Performed:   Procedure(s):  LEFT REVERSE SHOULDER ARTHROPLASTY    Surgeon(s) and Role:     *

## 2018-02-06 NOTE — ANESTHESIA PREPROCEDURE EVALUATION
PRE-OP EVALUATION    Patient Name: Jose Guadalupe Shea    Pre-op Diagnosis: ROTATOR CUFF TEAR AND ARTHRITIS LEFT SHOULDER    Procedure(s):  LEFT REVERSE SHOULDER ARTHROPLASTY    Surgeon(s) and Role:     * Janet Wood MD - Primary    Pre-op vitals rev BusPIRone HCl 15 MG Oral Tab Take 1 tablet (15 mg total) by mouth 2 (two) times daily. Disp:  Rfl: 0   Sulfacetamide Sodium-Sulfur 10-5 % External Emulsion Apply 1 Application topically 2 (two) times daily.  Disp: 340 g Rfl: 6       Allergies: Gluten Flou 0.25 Packs/day  For 7.00 Years     Types: Cigarettes    Quit date: 8/27/1972    Smokeless tobacco: Never Used    Alcohol use Yes    Comment: rarely       Drug use: No     Available pre-op labs reviewed.     Lab Results  Component Value Date   WBC 6.16 01/26

## 2018-02-06 NOTE — PLAN OF CARE
Patient/Family Goals    • Patient/Family Long Term Goal Progressing    • Patient/Family Short Term Goal Progressing        S/p left reverse shoulder. Arrived to floor from PACU. Denies pain at this time. VSS.  Ricci in place draining clear urine, pt has hx

## 2018-02-07 VITALS
HEART RATE: 80 BPM | DIASTOLIC BLOOD PRESSURE: 70 MMHG | HEIGHT: 62 IN | BODY MASS INDEX: 35.88 KG/M2 | OXYGEN SATURATION: 97 % | SYSTOLIC BLOOD PRESSURE: 128 MMHG | WEIGHT: 195 LBS | TEMPERATURE: 98 F | RESPIRATION RATE: 18 BRPM

## 2018-02-07 PROBLEM — Z47.89 ORTHOPEDIC AFTERCARE: Status: ACTIVE | Noted: 2018-02-07

## 2018-02-07 LAB
ERYTHROCYTE [DISTWIDTH] IN BLOOD BY AUTOMATED COUNT: 12.3 % (ref 11.5–16)
HCT VFR BLD AUTO: 34.1 % (ref 34–50)
HGB BLD-MCNC: 11.3 G/DL (ref 12–16)
MCH RBC QN AUTO: 31.4 PG (ref 27–33.2)
MCHC RBC AUTO-ENTMCNC: 33.1 G/DL (ref 31–37)
MCV RBC AUTO: 94.7 FL (ref 81–100)
PLATELET # BLD AUTO: 255 10(3)UL (ref 150–450)
RBC # BLD AUTO: 3.6 X10(6)UL (ref 3.8–5.1)
RED CELL DISTRIBUTION WIDTH-SD: 42.9 FL (ref 35.1–46.3)
WBC # BLD AUTO: 9.9 X10(3) UL (ref 4–13)

## 2018-02-07 PROCEDURE — 97165 OT EVAL LOW COMPLEX 30 MIN: CPT

## 2018-02-07 PROCEDURE — 97162 PT EVAL MOD COMPLEX 30 MIN: CPT

## 2018-02-07 PROCEDURE — 85027 COMPLETE CBC AUTOMATED: CPT | Performed by: ORTHOPAEDIC SURGERY

## 2018-02-07 PROCEDURE — 97535 SELF CARE MNGMENT TRAINING: CPT

## 2018-02-07 PROCEDURE — 97110 THERAPEUTIC EXERCISES: CPT

## 2018-02-07 RX ORDER — PSEUDOEPHEDRINE HCL 30 MG
100 TABLET ORAL 2 TIMES DAILY
Qty: 30 CAPSULE | Refills: 0 | Status: SHIPPED | OUTPATIENT
Start: 2018-02-07 | End: 2018-04-02

## 2018-02-07 RX ORDER — HYDROCODONE BITARTRATE AND ACETAMINOPHEN 10; 325 MG/1; MG/1
2 TABLET ORAL EVERY 4 HOURS PRN
Status: DISCONTINUED | OUTPATIENT
Start: 2018-02-08 | End: 2018-02-07

## 2018-02-07 RX ORDER — HYDROCODONE BITARTRATE AND ACETAMINOPHEN 10; 325 MG/1; MG/1
1 TABLET ORAL EVERY 4 HOURS PRN
Status: DISCONTINUED | OUTPATIENT
Start: 2018-02-08 | End: 2018-02-07

## 2018-02-07 NOTE — PROGRESS NOTES
[facility] Hospitalist Progress Note                                                                   [facility]  12/6/1950    CC: FU post op    Interval History:  - Doing well, plan for PLT  255.0     No results for input(s): GLU, BUN, CREATSERUM, GFRAA, GFRNAA, CA, NA, K, CL, CO2 in the last 72 hours. ROS: no change to ROS from my documentation yesterday, except as otherwise noted in the Interval History above.     Assessment/Yocasta

## 2018-02-07 NOTE — PROGRESS NOTES
[facility] [address] [place] [address]  Orthopedic Surgery  Progress Note    Clau Reese Patient Status:  Inpatient    1950 MRN [id number]   [facility] 3SW-A Attending Albino Hernandez MD   [facility] Day # 1 PCP Perri Buerger, MD     SUBJECTIVE:  IN

## 2018-02-07 NOTE — OCCUPATIONAL THERAPY NOTE
OCCUPATIONAL THERAPY QUICK EVALUATION - INPATIENT    Room Number: 352/352-A  Evaluation Date: 2/7/2018     Type of Evaluation: Initial  Presenting Problem:  (L reverse TSA)    Physician Order: IP Consult to Occupational Therapy  Reason for Therapy:  ADL/IA ENDOSCOPY PERFORMED      Comment: esophagitis, gastritis, gastric polyps (fundic               gland), Genao's (none on this examm, no                dysplasia), duodenal ulcers, neg for HP.  repeat               EGD in 2018     OCCUPATIONAL PROFILE    HO ASSESSMENT  Supine to Sit : Not tested  Sit to Stand: Supervision    Skilled Therapy Provided: OT educated patient on LUE restrictions and safety, sequencing for UB dressing as well as immobilizer managemment.  Patient able to use adaptive tech for some of - Analysis of occupational profile, problem-focused assessments, limited treatment options    Overall Complexity  LOW     OT Discharge Recommendations: Home (family assist)  OT Device Recommendations: None    PLAN   Patient has been evaluated and presents

## 2018-02-07 NOTE — PHYSICAL THERAPY NOTE
PHYSICAL THERAPY QUICK EVALUATION - INPATIENT    Room Number: 352/352-A  Evaluation Date: 2/7/2018  Presenting Problem: L reverse total shoulder 2/6/18  Physician Order: PT Eval and Treat    Problem List  Active Problems:    Left rotator cuff tear arthro House   Home Layout: Two level  Stairs to Enter : 2  Railing: No  Stairs to Bedroom: 12  Railing: Yes    Lives With: Spouse  Drives: Yes  Patient Owned Equipment: Cane  Patient Regularly Uses: Glasses    Prior Level of [place]: Independent ambulator w Skilled Therapy Provided: Pt presents in sitting. Pt instructed in how to johnnie/doff immobilizer. Needing min assist to johnnie/doff immobilizer. Instructed pt that immobilizer to be on at all times except during exercises and bathing.  Pt instructed in  following goals . ..     Patient was able to transfer with supervision to independent   Patient able to ambulate on level surfaces with supervision

## 2018-02-07 NOTE — CONSULTS
[facility] Hospitalist Initial Consult       CC: medical management s/p L total shoulder    History of Present Illness: Patient is a 69year old female with PMH sig for HTN, GERD, depression/anxiety who presents sp the above procedure.  She tolerated the procedure [DISCONTINUED] losartan 100 MG Oral Tab Take 1 tablet (100 mg total) by mouth once daily.  Disp: 90 tablet Rfl: 3   [DISCONTINUED] Terazosin HCl 5 MG Oral Cap TAKE 1 CAPSULE BY MOUTH EVERY EVENING Disp: 90 capsule Rfl: 3         Current Meds:  Scheduled: affect      Laboratory:  Recent Labs   Lab  02/07/18   0454   WBC  9.9   HGB  11.3*   MCV  94.7   PLT  255.0       No results for input(s): NA, K, CL, CO2, BUN, CREATSERUM, CA, CAION, MG, PHOS, GLU in the last 72 hours.     No results for input(s): ALT, AST

## 2018-02-07 NOTE — PROGRESS NOTES
Post Op Day 1 Ortho Note    Status Post Nerve Block:  Type of Nerve Block: Left Interscalene total shoulder  Single Injection Nerve Block    Post op review: No evidence of immediate anesthesia complications. No paresthesia noted.  Pt able to wiggle fingers

## 2018-02-07 NOTE — CM/SW NOTE
02/07/18 1500   Discharge disposition   Discharged to: Home-Health   Name of Romi Proc. Butler Romaine 1 services after discharge Skilled home care   Discharge transportation Private car

## 2018-02-07 NOTE — RESPIRATORY THERAPY NOTE
ELLIOT Equipment Usage Summary :            Set Mode :AUTO CPAP W FLEX          Usage in Hours:7;12          90% Pressure (EPAP) : 10           90% Insp Pressure (IPAP);           AHI : 4.1          Supplemental Oxygen :      LPM

## 2018-02-07 NOTE — CM/SW NOTE
02/07/18 1200   CM/SW Referral Data   Referral Source Social Work (self-referral)   Reason for Referral Discharge planning   Informant Patient   Patient Info   Patient's Mental Status Alert;Oriented   Patient Status Prior to Admission   Independent with

## 2018-02-07 NOTE — HOME CARE LIAISON
MET WITH PTNT AND OFFERED CHOICE  OF AGENCIES. PTNT AGREEABLE TO [facility]. MET WITH PTNT TO DISCUSS HOME HEALTH SERVICES AND COVERAGE CRITERIA. PTNT AGREEABLE TO [facility]. PTNT GIVEN RESIDENTIAL BROCHURE.  RESIDENTIAL WITH PROVIDE SN/PT ON DISC

## 2018-03-15 PROBLEM — G60.9 IDIOPATHIC PERIPHERAL NEUROPATHY: Status: ACTIVE | Noted: 2018-03-15

## 2018-03-19 PROBLEM — Z96.612 S/P REVERSE TOTAL SHOULDER ARTHROPLASTY, LEFT: Status: ACTIVE | Noted: 2018-03-19

## 2018-04-02 PROBLEM — M12.812 LEFT ROTATOR CUFF TEAR ARTHROPATHY: Status: RESOLVED | Noted: 2017-11-02 | Resolved: 2018-04-02

## 2018-04-02 PROBLEM — M67.432 GANGLION, LEFT WRIST: Status: RESOLVED | Noted: 2017-07-03 | Resolved: 2018-04-02

## 2018-04-02 PROBLEM — M17.12 PRIMARY OSTEOARTHRITIS OF LEFT KNEE: Status: RESOLVED | Noted: 2017-11-02 | Resolved: 2018-04-02

## 2018-04-02 PROBLEM — K21.9 GASTROESOPHAGEAL REFLUX DISEASE WITHOUT ESOPHAGITIS: Status: ACTIVE | Noted: 2018-04-02

## 2018-04-02 PROBLEM — M25.551 RIGHT HIP PAIN: Status: RESOLVED | Noted: 2018-01-12 | Resolved: 2018-04-02

## 2018-04-02 PROBLEM — M75.02 ADHESIVE CAPSULITIS OF LEFT SHOULDER: Status: RESOLVED | Noted: 2017-06-19 | Resolved: 2018-04-02

## 2018-04-02 PROBLEM — M75.102 LEFT ROTATOR CUFF TEAR ARTHROPATHY: Status: RESOLVED | Noted: 2017-11-02 | Resolved: 2018-04-02

## 2018-04-02 PROBLEM — G89.29 CHRONIC LEFT SHOULDER PAIN: Status: RESOLVED | Noted: 2017-06-19 | Resolved: 2018-04-02

## 2018-04-02 PROBLEM — L71.9 ROSACEA: Status: ACTIVE | Noted: 2018-04-02

## 2018-04-02 PROBLEM — M65.4 DE QUERVAIN'S TENOSYNOVITIS, LEFT: Status: RESOLVED | Noted: 2017-07-03 | Resolved: 2018-04-02

## 2018-04-02 PROBLEM — M54.12 CERVICAL RADICULITIS: Status: RESOLVED | Noted: 2017-08-04 | Resolved: 2018-04-02

## 2018-04-02 PROBLEM — M25.512 CHRONIC LEFT SHOULDER PAIN: Status: RESOLVED | Noted: 2017-06-19 | Resolved: 2018-04-02

## 2018-04-19 PROBLEM — M16.11 OSTEOARTHRITIS OF RIGHT HIP, UNSPECIFIED OSTEOARTHRITIS TYPE: Status: ACTIVE | Noted: 2018-04-19

## 2018-05-08 PROBLEM — M70.61 TROCHANTERIC BURSITIS OF RIGHT HIP: Status: ACTIVE | Noted: 2018-05-08

## 2018-07-03 PROBLEM — M79.672 BILATERAL FOOT PAIN: Status: ACTIVE | Noted: 2018-07-03

## 2018-07-03 PROBLEM — M79.671 BILATERAL FOOT PAIN: Status: ACTIVE | Noted: 2018-07-03

## 2019-07-03 PROBLEM — Z47.89 ORTHOPEDIC AFTERCARE: Status: RESOLVED | Noted: 2018-02-07 | Resolved: 2019-07-03

## 2019-07-03 PROBLEM — M70.61 TROCHANTERIC BURSITIS OF RIGHT HIP: Status: RESOLVED | Noted: 2018-05-08 | Resolved: 2019-07-03

## 2019-07-03 PROBLEM — G89.29 CHRONIC LEFT SHOULDER PAIN: Status: RESOLVED | Noted: 2017-06-19 | Resolved: 2019-07-03

## 2019-07-03 PROBLEM — E66.9 OBESITY (BMI 30.0-34.9): Status: ACTIVE | Noted: 2019-07-03

## 2019-07-03 PROBLEM — E66.01 SEVERE OBESITY WITH BODY MASS INDEX (BMI) OF 35.0 TO 39.9 WITH SERIOUS COMORBIDITY (HCC): Status: ACTIVE | Noted: 2017-03-30

## 2019-07-03 PROBLEM — M25.512 CHRONIC LEFT SHOULDER PAIN: Status: RESOLVED | Noted: 2017-06-19 | Resolved: 2019-07-03

## 2019-07-05 PROCEDURE — 86803 HEPATITIS C AB TEST: CPT | Performed by: INTERNAL MEDICINE

## 2019-09-06 PROCEDURE — 87086 URINE CULTURE/COLONY COUNT: CPT | Performed by: INTERNAL MEDICINE

## 2020-01-11 ENCOUNTER — APPOINTMENT (OUTPATIENT)
Dept: ULTRASOUND IMAGING | Facility: HOSPITAL | Age: 70
End: 2020-01-11
Attending: EMERGENCY MEDICINE
Payer: MEDICARE

## 2020-01-11 ENCOUNTER — HOSPITAL ENCOUNTER (EMERGENCY)
Facility: HOSPITAL | Age: 70
Discharge: HOME | End: 2020-01-11
Attending: EMERGENCY MEDICINE
Payer: MEDICARE

## 2020-01-11 VITALS
OXYGEN SATURATION: 98 % | BODY MASS INDEX: 34.6 KG/M2 | HEIGHT: 62 IN | HEART RATE: 70 BPM | WEIGHT: 188 LBS | TEMPERATURE: 98 F | SYSTOLIC BLOOD PRESSURE: 146 MMHG | RESPIRATION RATE: 16 BRPM | DIASTOLIC BLOOD PRESSURE: 86 MMHG

## 2020-01-11 DIAGNOSIS — M79.662 PAIN OF LEFT CALF: Primary | ICD-10-CM

## 2020-01-11 PROCEDURE — 99284 EMERGENCY DEPT VISIT MOD MDM: CPT | Performed by: EMERGENCY MEDICINE

## 2020-01-11 PROCEDURE — 93971 EXTREMITY STUDY: CPT | Performed by: EMERGENCY MEDICINE

## 2020-01-11 NOTE — ED NOTES
DC instructions handed to pt. Spouse at bedside. No distress noted. Pt denies any needs at this time.

## 2020-12-09 PROCEDURE — 88305 TISSUE EXAM BY PATHOLOGIST: CPT | Performed by: INTERNAL MEDICINE

## 2021-03-15 DIAGNOSIS — Z23 NEED FOR VACCINATION: ICD-10-CM

## 2021-04-29 PROBLEM — M70.61 GREATER TROCHANTERIC BURSITIS OF RIGHT HIP: Status: ACTIVE | Noted: 2021-04-29

## 2021-04-29 PROBLEM — M17.11 PRIMARY OSTEOARTHRITIS OF RIGHT KNEE: Status: ACTIVE | Noted: 2021-04-29

## 2021-06-24 PROBLEM — M54.2 CERVICAL PAIN: Status: ACTIVE | Noted: 2021-06-24

## 2021-06-24 PROBLEM — R51.9 INTRACTABLE HEADACHE, UNSPECIFIED CHRONICITY PATTERN, UNSPECIFIED HEADACHE TYPE: Status: ACTIVE | Noted: 2021-06-24

## 2022-01-31 PROBLEM — M75.121 NONTRAUMATIC COMPLETE TEAR OF RIGHT ROTATOR CUFF: Status: ACTIVE | Noted: 2022-01-31

## (undated) DEVICE — SUPER SPONGES,MEDIUM: Brand: KERLIX

## (undated) DEVICE — SUTURE NABSB OTHCRD 2 OS-6

## (undated) DEVICE — OSTEOTOME INTM SGL SRG CTRS

## (undated) DEVICE — STERILE TETRA-FLEX CF, ELASTIC BANDAGE, 2" X 5.5YD: Brand: TETRA-FLEX™CF

## (undated) DEVICE — SUTURE CHROMIC GUT 0 CT-1

## (undated) DEVICE — GAUZE SPONGES,12 PLY: Brand: CURITY

## (undated) DEVICE — TROC SURG 3.2MM

## (undated) DEVICE — BANDAGE ROLL,100% COTTON, 6 PLY, SMALL: Brand: KERLIX

## (undated) DEVICE — OCCLUSIVE GAUZE STRIP,3% BISMUTH TRIBROMOPHENATE IN PETROLATUM BLEND: Brand: XEROFORM

## (undated) DEVICE — REM POLYHESIVE ADULT PATIENT RETURN ELECTRODE: Brand: VALLEYLAB

## (undated) DEVICE — KIT TRC TRIMANO BEACH CHR ARM

## (undated) DEVICE — STERILE POLYISOPRENE POWDER-FREE SURGICAL GLOVES: Brand: PROTEXIS

## (undated) DEVICE — STERILE LATEX POWDER-FREE SURGICAL GLOVESWITH NITRILE COATING: Brand: PROTEXIS

## (undated) DEVICE — DECANTER SPIKE TRANSFLOW

## (undated) DEVICE — Device: Brand: STABLECUT®

## (undated) DEVICE — KENDALL SCD EXPRESS SLEEVES, KNEE LENGTH, MEDIUM: Brand: KENDALL SCD

## (undated) DEVICE — GOWN,SIRUS,FABRIC-REINFORCED,X-LARGE: Brand: MEDLINE

## (undated) DEVICE — ZIMMER® STERILE DISPOSABLE TOURNIQUET CUFF WITH PLC, DUAL PORT, DUAL BLADDER, 18 IN. (46 CM)

## (undated) DEVICE — UPPER EXTREMITY: Brand: MEDLINE INDUSTRIES, INC.

## (undated) DEVICE — STRETCH BANDAGE: Brand: CURITY

## (undated) DEVICE — SUTURE ETHIBOND 2 V-37

## (undated) DEVICE — CHLORAPREP 26ML APPLICATOR

## (undated) DEVICE — PADDING CAST WYTEX 2\" STER

## (undated) DEVICE — INTENDED TO AID IN THE PASSING OF SUTURES THROUGH BONE AND SOFT TISSUE DURING ORTHOPEDIC SURGERY: Brand: HOFFEE SUTURE RETRIEVER

## (undated) DEVICE — TETRA-FLEX CF WOVEN LATEX FREE ELASTIC BANDAGE 2" X 5.5 YD: Brand: TETRA-FLEX™CF

## (undated) DEVICE — Device

## (undated) DEVICE — PREMIUM WET SKIN PREP TRAY: Brand: MEDLINE INDUSTRIES, INC.

## (undated) DEVICE — IMMOB SHLDR STRAIGHT L

## (undated) DEVICE — ABDOMINAL PAD: Brand: DERMACEA

## (undated) DEVICE — SOL  .9 1000ML BTL

## (undated) DEVICE — TISSEEL SPRAY SET

## (undated) DEVICE — 3M™ IOBAN™ 2 ANTIMICROBIAL INCISE DRAPE 6650EZ: Brand: IOBAN™ 2

## (undated) DEVICE — ORTHO CDS-LF: Brand: MEDLINE INDUSTRIES, INC.

## (undated) DEVICE — GLOVE SURG PROTEXIS SIZE 7

## (undated) DEVICE — BIPOLAR FORCEPS CORD,BANANA LEADS: Brand: VALLEYLAB

## (undated) DEVICE — SUTURE PROLENE 4-0 PS-2

## (undated) DEVICE — DRILL BIT TWIST 1/16 1.6MM

## (undated) DEVICE — SPECIALTY ARM SLING: Brand: DEROYAL

## (undated) DEVICE — 1010 S-DRAPE TOWEL DRAPE 10/BX: Brand: STERI-DRAPE™

## (undated) DEVICE — SUTURE VICRYL 2-0 FSL

## (undated) DEVICE — FORCEP RADIAL JAW 4

## (undated) DEVICE — PROXIMATE SKIN STAPLERS (35 WIDE) CONTAINS 35 STAINLESS STEEL STAPLES (FIXED HEAD): Brand: PROXIMATE

## (undated) DEVICE — BLADE ELECTRODE: Brand: EDGE

## (undated) DEVICE — KIT DRLBT 2MM 3.2MM

## (undated) DEVICE — ESMARK: Brand: DEROYAL

## (undated) DEVICE — SUCTION CANISTER, 3000CC,SAFELINER: Brand: DEROYAL

## (undated) DEVICE — STANDARD HYPODERMIC NEEDLE,POLYPROPYLENE HUB: Brand: MONOJECT

## (undated) DEVICE — WRAP COOLING SHLDR W/ICE PILLO

## (undated) DEVICE — DRESSING AQUACEL AG 3.5 X 10

## (undated) DEVICE — 3M™ MICROFOAM™ TAPE 1528-4: Brand: 3M™ MICROFOAM™

## (undated) DEVICE — COVER SGL STRL LGHT HNDL BLU

## (undated) NOTE — LETTER
[facility] Testing Department  Phone: [phone]  OUTSIDE TESTING RESULT REQUEST      TO:   Dr. Candido Prakash and staff                     Today's Date: 1/16/18    FAX #: [phone]     IMPORTANT: FOR YOUR IMMEDIATE ATTENTION  Please FAX all

## (undated) NOTE — LETTER
5/18/2017          Yan Castillo [facility] [address]    Dear Ms Gris Hubbard    Your biopsies of the small bowel are normal.  Your stomach polyps are fundic gland polyps.   This is a benign type of polyp usually caused by antacid m

## (undated) NOTE — IP AVS SNAPSHOT
[address]            (For Outpatient Use Only) Initial Admit Date: 2/6/2018   Inpt/Obs Admit Date: Inpt: 2/6/18 / Obs: N/A   Discharge Date:    Hospital Acct:  [de-identified]   MRN: [de-identified]   CSN: [id number]        ENCOUNTER  Patient C Subscriber Name:  Tylor Garcia :    Subscriber ID:  Pt Rel to Subscriber:    Hospital Account Financial Class: Medicare    2018

## (undated) NOTE — IP AVS SNAPSHOT
Patient Demographics     Address  [address] [facility] Phone  [phone] (Home) *Preferred*  [phone] [facility]) E-mail Address  [email or web address]      Emergency Contact(s)     Name Relation Home Work Mobile    Teofilo Mckeon [address] [place] [address] stop the circulation to the lower arm.       #5  There is a finger loop/support pocket on the chest strap for your fingers when you are walking around   Also a pillow behind the elbow when sitting or lying down helps you to keep your arm in the best positio ? Take pain medication as prescribed with food, allowing 30-45 minutes to take effect. ? Do not drink alcohol while on pain medication. ? Keep pain manageable; pain should not disrupt your sleep or activities like getting out of bed or walking.   ? As you ? Sutures/staples will be removed at first office visit                                                               (10 days- 3 weeks). Body changes  ? Constipation – common with the use of narcotics. ?  Use stool softeners such as Colace or Senokot w ? Temperature >101F, on 2 subsequent readings. ? Increased pain at incision not relieved by pain medication. ? Your surgeon’s office is available 24 hours a day 7 days a week.  The answering service will direct your call to your surgeon, the on call surge Commonly known as:  COLACE  Next dose due:  2/7/2018 9 pm      Take 100 mg by mouth 2 (two) times daily.    Douglas Hendrix MD         DULoxetine HCl 60 MG Cpep  Commonly known as:  CYMBALTA  Next dose due:  2/7/2018 9 pm      Take 60 mg by mouth da Next dose due:  2/7/2018 evening      Take 5 mg by mouth nightly. TraMADol HCl 50 MG Tabs  Commonly known as:  ULTRAM      Take 1 tablet (50 mg total) by mouth every 6 (six) hours as needed for Pain.    MD LUCHO [facility] OR  Next [id number] acetaminophen (TYLENOL) tab 650 mg 02/07/18 0903 Given      [id number] acetaminophen (TYLENOL) tab 650 mg 02/07/18 1249 Given      [id number] ceFAZolin sodium (ANCEF/KEFZOL) 2 GM/20ML premix IV syringe 2 g 02/06/18 1706 New Bag      [id number] ceFA Testing Performed By     Felicia Shoemaker Name Director Address Valid Date Range    [place] [address] LAB Lynne Nagy  [address] [address] 02/03/16 1801 - Present            Microbiology Results (All)     None NECK:  Supple, without JVD or lymphadenopathy. LUNGS:  Clear to auscultation and percussion. HEART:  Regular rate and rhythm. ABDOMEN:  Without masses. EXTREMITIES:  Significant for her left shoulder.   She has normal gait and station, ambulates without [facility] Hospitalist Initial Consult       CC: medical management s/p L total shoulder    History of Present Illness: Patient is a 69year old female with PMH sig for HTN, GERD, depression/anxiety who presents sp the above procedure.  She tolerated the procedure [DISCONTINUED] losartan 100 MG Oral Tab Take 1 tablet (100 mg total) by mouth once daily.  Disp: 90 tablet Rfl: 3   [DISCONTINUED] Terazosin HCl 5 MG Oral Cap TAKE 1 CAPSULE BY MOUTH EVERY EVENING Disp: 90 capsule Rfl: 3         Current Meds:  Scheduled: Psych: AAO x 3, with appropriate affect      Laboratory:  Recent Labs   Lab  02/07/18   0454   WBC  9.9   HGB  11.3*   MCV  94.7   PLT  255.0       No results for input(s): NA, K, CL, CO2, BUN, CREATSERUM, CA, CAION, MG, PHOS, GLU in the last 72 hours. Past Medical History[KT. 1]  Past Medical History:   Diagnosis Date   • Anxiety state    • Depression 8/27/2012   • FHx: breast cancer     sister age 58   • Fibromyalgia    • Fibromyalgia    • GERD (gastroesophageal reflux disease) 8/27/2012   • Gluten into Prior Level of [place]: Independent ambulator without device. Drives    SUBJECTIVE  \"I just need to know how to work this sling. \"    OBJECTIVE[KT. 1]  Precautions: Other (Comment) (immobilizer)  Fall Risk: Standard fall risk[KT. 2]    WEIGHT BEARING R johnnie/doff immobilizer. Needing min assist to johnnie/doff immobilizer. Instructed pt that immobilizer to be on at all times except during exercises and bathing. Pt instructed in dressing techniques.  Instructed in active elbow flex/ext, supination/pronation, Patient was able to transfer with supervision to independent   Patient able to ambulate on level surfaces with supervision[KT. 1]        Attribution Olmedo    KT. 1 - Fidelina Garcia, PT on 2/7/2018  2:08 PM  KT. 2 - Fidelina Garcia, PT on 2/7/2018  2:09 PM • Visual impairment     glasses       Past Surgical History  Past Surgical History:  1992: APPENDECTOMY      Comment: [place]  2017: CARPAL TUNNEL RELEASE Left  2007: COLONOSCOPY  5/2017: EGD      Comment: MAC: gastritis, large hiatal hernia, gastric NEUROLOGICAL FINDINGS                   ACTIVITIES OF DAILY LIVING ASSESSMENT  AM-PAC ‘6-Clicks’ Inpatient Daily Activity Short Form   How much help from another person does the patient currently need…  -   Putting on and taking off regular lower body clot activities of daily living, rest and sleep, work, leisure and social participation. The AM-JACOB ' '6-Clicks' Inpatient Daily Activity Short Form was completed and  this patient  is demonstrating a 42.8% degree impairment in activities of daily living.  Susan INFLUENZA 11/01/14     INFLUENZA 09/12/14     Pneumococcal (Prevnar 13) 03/30/17     Pneumococcal (Prevnar 13) defer-11/21/14     Deferral:  Patient Refused       Future Appointments        Provider Al Patel    2/19/2018 10:30 AM Belen Chun

## (undated) NOTE — IP AVS SNAPSHOT
[facility] [place] [address] [place], [address] ~ [phone]                Discharge Summary   5/11/2017    Beau Rosario           Admission Information        Provider Department    5/11/2017 Marni Mead MD  Endosc Take 1 tablet by mouth daily.     Yunier Kaur     [    ]    [    ]    [    ]    [    ]       gabapentin 300 MG Caps   Commonly known as:  NEURONTIN        2 caps  q am, 1 cap at noon, and 2 caps at hs    [place]     [    ]    [    ]    [ contact your Primary Care Physician. Activities:  - Take it easy today. Do not return to work today. - Do not drive today. - Do not operate any machinery today (including kitchen equipment).     Gastroscopy:  - You may have a sore throat for 2-3 days f Recent Hematology Lab Results (cont.)  (Last 3 results in the past 90 days)    Neutrophil % Lymphocyte % Monocyte % Eosinophil % Basophil % Prelim Neut Abs Final Neut Abs Lymphocyte Abso Monocyte Absolu Eosinophil Abso Basophil Absolu    (03/15/17)  61.2 ( returning the survey you will receive in the mail. Thank you! MyChart     Visit [employer]  You can access your Drip[employer]rophart to more actively manage your health care and view more details from this visit by going to [email or web address].   If you've

## (undated) NOTE — ED AVS SNAPSHOT
Roma Aguilar   MRN: [id number]    Department:  [facility] Emergency Department   Date of Visit:  1/11/2020           Disclosure     Insurance plans vary and the physician(s) referred by the ER may not be covered by your plan.  Please contact tell this physician (or your personal doctor if your instructions are to return to your personal doctor) about any new or lasting problems. The primary care or specialist physician will see patients referred from the [facility] Emergency Department.  Silvana Toth